# Patient Record
Sex: MALE | Race: WHITE | HISPANIC OR LATINO | Employment: PART TIME | URBAN - METROPOLITAN AREA
[De-identification: names, ages, dates, MRNs, and addresses within clinical notes are randomized per-mention and may not be internally consistent; named-entity substitution may affect disease eponyms.]

---

## 2022-11-23 ENCOUNTER — MEDICAL CORRESPONDENCE (OUTPATIENT)
Dept: ADMINISTRATIVE | Facility: CLINIC | Age: 27
End: 2022-11-23

## 2022-11-29 ENCOUNTER — TRANSCRIBE ORDERS (OUTPATIENT)
Dept: OTHER | Age: 27
End: 2022-11-29

## 2022-11-29 DIAGNOSIS — S14.151A: ICD-10-CM

## 2022-11-29 DIAGNOSIS — G82.50 SPASTIC QUADRIPARESIS (H): ICD-10-CM

## 2022-11-29 DIAGNOSIS — Q06.9 SPINAL CORD ANOMALY (H): Primary | ICD-10-CM

## 2022-11-29 DIAGNOSIS — G91.1 OBSTRUCTIVE HYDROCEPHALUS (H): ICD-10-CM

## 2022-11-30 ENCOUNTER — TELEPHONE (OUTPATIENT)
Dept: NEUROSURGERY | Facility: CLINIC | Age: 27
End: 2022-11-30

## 2022-11-30 NOTE — TELEPHONE ENCOUNTER
Referred by: Ruchi Mocnada [facility]   [address] [place] [id number]   Phone: [phone], Fax: [phone]   Please call to schedule your appointment             Order Questions    Question Answer   Preferred Location: [facility] Neurosurgery [facility]   Scheduling Instructions: Please call to schedule your appointment   My Clinical Question Is: 2nd opinion, Intramedullary abnormality of spinal cord.  MRI of cervical spine Oct 2022 at [facility]

## 2022-11-30 NOTE — TELEPHONE ENCOUNTER
Care everywhere accessed for referring provider notes-  Referred by: Ruchi Moncada PAC   Erin     The following Images have been uploaded to PACS  06/29/2022-MR Thoracic  06/30/2022-MR Lumbar  06/27/2022-MR Cervical  06/27/2022-MR Brain  06/22/2022-CT Head    Pt has also had more recent studies at [facility].  I have send fax requesting these images also.   Mandy Aguilar LPN

## 2022-12-05 NOTE — TELEPHONE ENCOUNTER
I called [facility] in [place] [phone] and requested more recent films they will push these images today.    Mandy Aguilar LPN

## 2023-02-12 ENCOUNTER — HEALTH MAINTENANCE LETTER (OUTPATIENT)
Age: 28
End: 2023-02-12

## 2023-02-14 ENCOUNTER — OFFICE VISIT (OUTPATIENT)
Dept: NEUROSURGERY | Facility: CLINIC | Age: 28
End: 2023-02-14
Attending: PHYSICIAN ASSISTANT
Payer: COMMERCIAL

## 2023-02-14 VITALS
HEIGHT: 72 IN | WEIGHT: 157.19 LBS | SYSTOLIC BLOOD PRESSURE: 113 MMHG | HEART RATE: 68 BPM | BODY MASS INDEX: 21.29 KG/M2 | DIASTOLIC BLOOD PRESSURE: 60 MMHG

## 2023-02-14 DIAGNOSIS — G91.1 OBSTRUCTIVE HYDROCEPHALUS (H): Primary | ICD-10-CM

## 2023-02-14 DIAGNOSIS — G95.9 SPINAL CORD LESION (H): ICD-10-CM

## 2023-02-14 PROCEDURE — 99204 OFFICE O/P NEW MOD 45 MIN: CPT | Performed by: NEUROLOGICAL SURGERY

## 2023-02-14 PROCEDURE — G0463 HOSPITAL OUTPT CLINIC VISIT: HCPCS | Performed by: NEUROLOGICAL SURGERY

## 2023-02-14 RX ORDER — BACLOFEN 10 MG/1
15 TABLET ORAL
COMMUNITY
Start: 2022-09-08

## 2023-02-14 RX ORDER — DOCUSATE SODIUM 100 MG/1
100 CAPSULE, LIQUID FILLED ORAL
COMMUNITY
Start: 2022-07-14

## 2023-02-14 RX ORDER — ACETAMINOPHEN 325 MG/1
2 TABLET ORAL EVERY 4 HOURS PRN
COMMUNITY
Start: 2022-07-14

## 2023-02-14 RX ORDER — ALPRAZOLAM 0.25 MG
1 TABLET ORAL 3 TIMES DAILY PRN
COMMUNITY
Start: 2022-07-22

## 2023-02-14 RX ORDER — GABAPENTIN 100 MG/1
CAPSULE ORAL
COMMUNITY
Start: 2022-11-23

## 2023-02-14 RX ORDER — DULOXETIN HYDROCHLORIDE 20 MG/1
40 CAPSULE, DELAYED RELEASE ORAL
COMMUNITY
Start: 2022-08-22

## 2023-02-14 RX ORDER — HYDROCODONE BITARTRATE AND ACETAMINOPHEN 5; 325 MG/1; MG/1
1 TABLET ORAL
COMMUNITY
Start: 2022-08-15

## 2023-02-14 NOTE — PATIENT INSTRUCTIONS
You met with Pediatric Neurosurgery at the [facility]    FIDENCIO Fabian Dr., Dr., NP      Pediatric Appointment Scheduling and Call Center:   [phone]    Nurse Practitioner  [phone]    Mailing Address  [address] [address]    Street Address   [address] [place] [address]    Fax Number  [phone]    For urgent matters that cannot wait until the next business day, occur over a holiday and/or weekend, report directly to your nearest ER or you may call [phone] and ask to page the Pediatric Neurosurgery Resident on call.

## 2023-02-14 NOTE — NURSING NOTE
"Chief Complaint   Patient presents with     Consult       Vitals:    02/14/23 1548   BP: 113/60   BP Location: Right arm   Patient Position: Sitting   Cuff Size: Adult Regular   Pulse: 68   Weight: 157 lb 3 oz (71.3 kg)   Height: 5' 11.85\" (182.5 cm)   HC: 59 cm (23.23\")       Ronnie Martinez  February 14, 2023  "

## 2023-02-15 NOTE — PROGRESS NOTES
It was a pleasure meeting Pieter and his girlfriend in neurosurgery clinic today for further evaluation and management of his neurological deficits in the setting of enlarged ventricles and cervical spine lesion of unknown etiology.  This patient has been cared for at [facility] and [facility] and has been evaluated by both neurology and neurosurgery who have been managing him.    This is a very pleasant 27-year-old gentleman.  His active problem list includes the diagnoses of attention deficit hyperactivity disorder, history of bradycardia, bilateral varicoceles, multifactorial cognitive dysfunction, spasticity, generalized anxiety disorder, spinal cord anomaly as noted and ventriculomegaly.    He reports that he believes he began having some symptoms as early as 2018 and that his left-sided weakness has been slowly progressive.  He notes a few episodes of acute deterioration following 2 falls.  He fell in December 2021, slipping on ice and suffering a head injury with worsening of his symptoms.  He also fell in May 2022, again striking his head, and again with significant progression of his symptoms.  He has been significantly weak, involving his left upper and lower extremities.  He has not been able to ambulate without use of assistive device for about a year it sounds.  He currently uses a cane and also has used a walker in the past.  He  has a wheelchair which he uses sometimes.  He was referred to general neurology on 6/21/2022 for concussion following that fall.  He had been followed previously for ventriculomegaly, which was noted on MRI scans from his first fall.  He was also admitted to [facility] in [place] where he saw neurology and neurosurgery.  He had a lumbar puncture at that time and according to reports there was no concern of transverse myelitis.  He saw Dr. Scott in neurosurgery at [facility], last time October 2022, where biopsy of the lesion in the cervical spinal cord was discussed.  It  was elected to hold off on a biopsy at this time.  He reports that since last summer, if symptoms been relatively stable.  He is not having severe headaches, vision changes, vomiting, lethargy or other indirect symptoms of intracranial hypertension.    His medications include baclofen, Xanax, duloxetine, Keppra, hydrocodone, and multivitamins.    On exam, he is well-appearing.  He is awake, alert, fully oriented.  Speech is fluent and appropriate.  Face is symmetric.  Extraocular movements are full.  Pupils are equal and reactive.  Gaze is conjugate.  He is unable to lift his left arm to measure pronator drift.  Strength is full on the right, upper and lower extremities.  On the left, biceps is 3/5, triceps 3/5, wrist flexion and extension 4/5, hand intrinsics 4/5.  His lower extremity strength is 4/5.  He wears an AFO on the left foot.  He is able to slowly walk with the assistance of a cane.  His tone is significantly increased on the left, nearly rigid.    Several imaging studies were reviewed.  I reviewed several brain MRI scans, the last being on June 27, 2022.  This reveals enlargement of bilateral, lateral and third ventricles with normal appearance of the fourth ventricle, suggesting possible aqueductal stenosis.  This is unchanged from scans obtained over the last year.  An MRI scan of the brain obtained October 4, 2022, reveals similar findings with no change.  I reviewed his cervical spine MRI scan obtained with and without contrast, the last being on June 27, 2022.  This reveals a congenitally narrowed spinal canal.  There is significant T2 hyperintensity extending from cervical 1 to cervical 5 with expansion of the cord.  This would be consistent with trauma or tumor.  Cervical spine CT scans obtained revealed also evidence of congenitally narrowed spinal canal as well as loss of lordosis of the cervical spine, with kyphosis.    Impression, progressive neurological decline, following 2 falls, with  unclear etiology.  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  Cervical spinal cord T2 hyperintense signal in the setting of congenitally narrowed spinal canal, consistent with traumatic injury, astrocytoma less likely.    Plan we discussed that it is quite possible that he has suffered a spinal cord injury due to a congenitally narrowed spinal canal.  We also discussed that astrocytoma is not ruled out nor is any other inflammatory type lesion, although it sounds as if transverse myelitis was ruled out last year.  I would recommend a repeat MRI of the cervical spine, as it has been nearly 8 months since he has had that study.  We also recommend at the same time a brain MRI scan done be done with high-resolution images to evaluate for aqueductal obstruction.  We will do a virtual visit following completion of the studies.  Both he and his girlfriend are happy with this plan

## 2023-02-21 DIAGNOSIS — Q03.0 AQUEDUCTAL STENOSIS (H): ICD-10-CM

## 2023-02-21 DIAGNOSIS — S14.109D INJURY OF CERVICAL SPINE, SUBSEQUENT ENCOUNTER (H): Primary | ICD-10-CM

## 2023-02-24 ENCOUNTER — TELEPHONE (OUTPATIENT)
Dept: NEUROSURGERY | Facility: CLINIC | Age: 28
End: 2023-02-24
Payer: COMMERCIAL

## 2023-05-16 ENCOUNTER — VIRTUAL VISIT (OUTPATIENT)
Dept: NEUROSURGERY | Facility: CLINIC | Age: 28
End: 2023-05-16
Attending: NEUROLOGICAL SURGERY
Payer: COMMERCIAL

## 2023-05-16 VITALS — WEIGHT: 145 LBS | HEIGHT: 71 IN | BODY MASS INDEX: 20.3 KG/M2

## 2023-05-16 DIAGNOSIS — G95.9 SPINAL CORD LESION (H): ICD-10-CM

## 2023-05-16 DIAGNOSIS — G91.1 OBSTRUCTIVE HYDROCEPHALUS (H): Primary | ICD-10-CM

## 2023-05-16 PROCEDURE — 99214 OFFICE O/P EST MOD 30 MIN: CPT | Mod: VID | Performed by: NEUROLOGICAL SURGERY

## 2023-05-16 ASSESSMENT — PAIN SCALES - GENERAL: PAINLEVEL: NO PAIN (0)

## 2023-05-16 NOTE — NURSING NOTE
Is the patient currently in the state of [place]? YES    Visit mode:VIDEO    If the visit is dropped, the patient can be reconnected by: VIDEO VISIT: Text to cell phone: [phone]    Will anyone else be joining the visit? NO      How would you like to obtain your AVS? MyChart    Are changes needed to the allergy or medication list? YES: Due to time, we didn't review meds.  Pt stated they are all up to date.    Reason for visit: Video Visit    Date of pt reported vitals: estimation  Pain: no  Susy Ojeda

## 2023-05-16 NOTE — LETTER
5/16/2023      RE: Pieter Duggan  [address]  [facility] [address]     Dear Colleague,    Thank you for the opportunity to participate in the care of your patient, Pieter Duggan, at the [facility]. Please see a copy of my visit note below.    It was a pleasure virtually seeing Pieter in neurosurgery clinic today in follow-up.  He is a very pleasant 27-year-old male who is seen in person on February 14, 2023 for further evaluation of neurologic deficits in the setting of T2 hyperintensities involving his spinal cord.  He was also noted to have enlarged ventricles.    The patient had previously been cared for at [facility] and [facility] and been evaluated by both neurology and neurosurgery.  He has a history of attention deficit hyperactivity disorder, bradycardia, multifactorial cognitive dysfunction, spasticity, generalized anxiety disorder and the above-mentioned findings.  His symptoms began 2018 that included left-sided weakness that was slowly progressive.  He had at least 2 episodes of acute deterioration following 2 falls, once in December 2021 when he slipped on ice and suffered a head injury.  The second occurred in May 2, 1922 again when he struck his head.  He notes significant weakness involving his left upper and lower extremities and he has not been able to ambulate without the use of an assistive device.  Uses a cane.  He also occasionally uses a wheelchair.  He was seen by general neurologist in June 2022 for concussion.  He had been followed for ventriculomegaly which was noted on serial MRI scans.  His MRI spine scan demonstrated T2 hyperintensities involving the cord and apparently he underwent a spinal tap and transverse myelitis was ruled out after neurology evaluation.  A neurosurgeon at [facility] considered a biopsy of the T2 hyperintensities involving his spinal cord to  obtain diagnosis however it was elected to hold off at that time.  He is not having headaches, vision changes, vomiting, lethargy or intracranial hypertension symptoms.  He is here today after undergoing repeat imaging for follow-up.    I reviewed his MRI brain, cervical, thoracic and lumbar spine rupture obtained on March 14, 2023 and discussed these findings with him.  The brain MRI scan reveals ventriculomegaly involving the lateral and third ventricles with normal morphology of the fourth ventricle.  The cerebral aqueduct appears grossly patent however the third ventricular floor appears significantly bowing down which could indicate increased pressure.  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     Cervical spine MRI scan reveals persistent T2 hyperintensity primarily centrally within the spinal cord extending from C2-C6 with decreased cord expansion and development of what appears to be mild atrophy, more consistent with myelomalacia rather than tumor.  There is no evidence of enhancement within the scan, also suggesting that this is not a tumor.  There is congenital spinal canal stenosis primarily in the mid cervical spine with a central disc protrusion at C5-6 causing very mild effacement of the ventral thecal sac with no significant spinal stenosis or foraminal stenosis.  Thoracic spine MRI again reveals some likely thin bands of scar tissue in the posterior aspect of the thecal sac with normal cord signal intensity and no significant thoracic syringomyelia.  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  The lumbar spine MRI scan reveals the the tip of the conus medullaris likely in the L1-2 area although not well visualized with again more evidence of arachnoiditis with clumping of the cauda equina nerve roots.  There is no fatty filum.    Impression   #1 triventriculomegaly with bowing down of the third ventricular floor which could be consistent with aqueductal  stenosis although his aqueduct appears widely patent.    #2 the spinal cord lesion is more likely consistent with trauma, given there has been progressive myelomalacia rather than cord expansion which emerged system the tumor.  Also, the findings of adhesive arachnoiditis would support this. In the setting of congenital narrowing of spinal canal and two traumas that preceded his deficits, spinal cord injury seems most likely.     Plan  It may be useful to get more information to see if he has aqueductal stenosis and this could be done with a cardiac gated phase contrast study to measure flow through the aqueduct and also thin cut T2 sagittal sequences to better study the anatomy of his cerebral aqueduct and third ventricle.  Would also obtain dilated fundoscopic exam. Given the cervical syringomyelia, and his deficits, it would also be useful to repeat his CT myelogram for evaluation of flow obstruction within the spinal subarachnoid space.  It may be possible to reduce the syringomyelia if he has such a block.  I think it would be useful for him to come down to the [facility] for the studies a face-to-face visit.            Please do not hesitate to contact me if you have any questions/concerns.     Sincerely,       Javed Goldberg MD

## 2023-05-25 NOTE — PROGRESS NOTES
It was a pleasure virtually seeing Pieter in neurosurgery clinic today in follow-up.  He is a very pleasant 27-year-old male who is seen in person on February 14, 2023 for further evaluation of neurologic deficits in the setting of T2 hyperintensities involving his spinal cord.  He was also noted to have enlarged ventricles.    The patient had previously been cared for at [facility] and [facility] and been evaluated by both neurology and neurosurgery.  He has a history of attention deficit hyperactivity disorder, bradycardia, multifactorial cognitive dysfunction, spasticity, generalized anxiety disorder and the above-mentioned findings.  His symptoms began 2018 that included left-sided weakness that was slowly progressive.  He had at least 2 episodes of acute deterioration following 2 falls, once in December 2021 when he slipped on ice and suffered a head injury.  The second occurred in May 2, 1922 again when he struck his head.  He notes significant weakness involving his left upper and lower extremities and he has not been able to ambulate without the use of an assistive device.  Uses a cane.  He also occasionally uses a wheelchair.  He was seen by general neurologist in June 2022 for concussion.  He had been followed for ventriculomegaly which was noted on serial MRI scans.  His MRI spine scan demonstrated T2 hyperintensities involving the cord and apparently he underwent a spinal tap and transverse myelitis was ruled out after neurology evaluation.  A neurosurgeon at [facility] considered a biopsy of the T2 hyperintensities involving his spinal cord to obtain diagnosis however it was elected to hold off at that time.  He is not having headaches, vision changes, vomiting, lethargy or intracranial hypertension symptoms.  He is here today after undergoing repeat imaging for follow-up.    I reviewed his MRI brain, cervical, thoracic and lumbar spine rupture obtained on March 14, 2023 and discussed these findings with  him.  The brain MRI scan reveals ventriculomegaly involving the lateral and third ventricles with normal morphology of the fourth ventricle.  The cerebral aqueduct appears grossly patent however the third ventricular floor appears significantly bowing down which could indicate increased pressure.  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     Cervical spine MRI scan reveals persistent T2 hyperintensity primarily centrally within the spinal cord extending from C2-C6 with decreased cord expansion and development of what appears to be mild atrophy, more consistent with myelomalacia rather than tumor.  There is no evidence of enhancement within the scan, also suggesting that this is not a tumor.  There is congenital spinal canal stenosis primarily in the mid cervical spine with a central disc protrusion at C5-6 causing very mild effacement of the ventral thecal sac with no significant spinal stenosis or foraminal stenosis.  Thoracic spine MRI again reveals some likely thin bands of scar tissue in the posterior aspect of the thecal sac with normal cord signal intensity and no significant thoracic syringomyelia.  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  The lumbar spine MRI scan reveals the the tip of the conus medullaris likely in the L1-2 area although not well visualized with again more evidence of arachnoiditis with clumping of the cauda equina nerve roots.  There is no fatty filum.    Impression   #1 triventriculomegaly with bowing down of the third ventricular floor which could be consistent with aqueductal stenosis although his aqueduct appears widely patent.    #2 the spinal cord lesion is more likely consistent with trauma, given there has been progressive myelomalacia rather than cord expansion which emerged system the tumor.  Also, the findings of adhesive arachnoiditis would support this. In the setting of congenital narrowing of spinal canal and two traumas that  preceded his deficits, spinal cord injury seems most likely.     Plan  It may be useful to get more information to see if he has aqueductal stenosis and this could be done with a cardiac gated phase contrast study to measure flow through the aqueduct and also thin cut T2 sagittal sequences to better study the anatomy of his cerebral aqueduct and third ventricle.  Would also obtain dilated fundoscopic exam. Given the cervical syringomyelia, and his deficits, it would also be useful to repeat his CT myelogram for evaluation of flow obstruction within the spinal subarachnoid space.  It may be possible to reduce the syringomyelia if he has such a block.  I think it would be useful for him to come down to the [facility] for the studies a face-to-face visit.

## 2023-06-13 DIAGNOSIS — Q03.0 AQUEDUCTAL STENOSIS (H): Primary | ICD-10-CM

## 2023-06-13 DIAGNOSIS — S14.109D INJURY OF CERVICAL SPINE, SUBSEQUENT ENCOUNTER (H): ICD-10-CM

## 2023-06-14 DIAGNOSIS — S14.109D INJURY OF CERVICAL SPINE, SUBSEQUENT ENCOUNTER (H): Primary | ICD-10-CM

## 2023-06-14 DIAGNOSIS — Q03.0 AQUEDUCTAL STENOSIS (H): ICD-10-CM

## 2024-03-10 ENCOUNTER — HEALTH MAINTENANCE LETTER (OUTPATIENT)
Age: 29
End: 2024-03-10

## 2024-07-17 ENCOUNTER — TELEPHONE (OUTPATIENT)
Dept: NEUROSURGERY | Facility: CLINIC | Age: 29
End: 2024-07-17
Payer: COMMERCIAL

## 2024-07-17 NOTE — TELEPHONE ENCOUNTER
[facility] Call Center    Phone Message    May a detailed message be left on voicemail: No    Reason for Call: Other: Pt called asking about getting a referral from Yusuf to another neurosurgeon in [place] as Pt has moved. Pt asked if it could be sent to his email? Please call back to advise       Action Taken: Message routed to:  [facility] Neurosurgery    Travel Screening: Not Applicable     Date of Service:

## 2024-07-18 DIAGNOSIS — S14.109D INJURY OF CERVICAL SPINE, SUBSEQUENT ENCOUNTER (H): Primary | ICD-10-CM

## 2024-07-18 DIAGNOSIS — Q03.0 AQUEDUCTAL STENOSIS (H): ICD-10-CM

## 2025-03-16 ENCOUNTER — HEALTH MAINTENANCE LETTER (OUTPATIENT)
Age: 30
End: 2025-03-16